# Patient Record
Sex: FEMALE
[De-identification: names, ages, dates, MRNs, and addresses within clinical notes are randomized per-mention and may not be internally consistent; named-entity substitution may affect disease eponyms.]

---

## 2020-09-23 ENCOUNTER — RECORD ABSTRACTING (OUTPATIENT)
Age: 45
End: 2020-09-23

## 2020-09-23 DIAGNOSIS — R22.1 LOCALIZED SWELLING, MASS AND LUMP, NECK: ICD-10-CM

## 2020-09-23 DIAGNOSIS — Z87.09 PERSONAL HISTORY OF OTHER DISEASES OF THE RESPIRATORY SYSTEM: ICD-10-CM

## 2020-09-23 PROBLEM — Z00.00 ENCOUNTER FOR PREVENTIVE HEALTH EXAMINATION: Status: ACTIVE | Noted: 2020-09-23

## 2020-09-29 ENCOUNTER — APPOINTMENT (OUTPATIENT)
Dept: OTOLARYNGOLOGY | Facility: CLINIC | Age: 45
End: 2020-09-29
Payer: COMMERCIAL

## 2020-09-29 VITALS
HEART RATE: 84 BPM | TEMPERATURE: 97.2 F | SYSTOLIC BLOOD PRESSURE: 132 MMHG | DIASTOLIC BLOOD PRESSURE: 91 MMHG | HEIGHT: 70 IN | BODY MASS INDEX: 24.34 KG/M2 | WEIGHT: 170 LBS

## 2020-09-29 DIAGNOSIS — F17.200 NICOTINE DEPENDENCE, UNSPECIFIED, UNCOMPLICATED: ICD-10-CM

## 2020-09-29 DIAGNOSIS — Z87.09 PERSONAL HISTORY OF OTHER DISEASES OF THE RESPIRATORY SYSTEM: ICD-10-CM

## 2020-09-29 DIAGNOSIS — J39.2 OTHER DISEASES OF PHARYNX: ICD-10-CM

## 2020-09-29 DIAGNOSIS — J38.4 EDEMA OF LARYNX: ICD-10-CM

## 2020-09-29 DIAGNOSIS — K21.9 GASTRO-ESOPHAGEAL REFLUX DISEASE W/OUT ESOPHAGITIS: ICD-10-CM

## 2020-09-29 DIAGNOSIS — R07.0 PAIN IN THROAT: ICD-10-CM

## 2020-09-29 PROCEDURE — 31575 DIAGNOSTIC LARYNGOSCOPY: CPT

## 2020-09-29 PROCEDURE — 99243 OFF/OP CNSLTJ NEW/EST LOW 30: CPT | Mod: 25

## 2020-09-29 RX ORDER — FLUTICASONE PROPIONATE 50 MCG
50 SPRAY, SUSPENSION NASAL
Refills: 0 | Status: ACTIVE | COMMUNITY

## 2020-09-29 RX ORDER — RANITIDINE 300 MG/1
300 TABLET ORAL
Refills: 0 | Status: DISCONTINUED | COMMUNITY
End: 2020-09-29

## 2020-09-29 RX ORDER — LEVOTHYROXINE SODIUM 0.03 MG/1
25 TABLET ORAL
Refills: 0 | Status: DISCONTINUED | COMMUNITY
End: 2020-09-29

## 2020-09-29 RX ORDER — LEVOTHYROXINE SODIUM 0.05 MG/1
50 TABLET ORAL
Refills: 0 | Status: DISCONTINUED | COMMUNITY
End: 2020-09-29

## 2020-09-29 RX ORDER — LEVOTHYROXINE SODIUM 175 UG/1
175 TABLET ORAL
Refills: 0 | Status: ACTIVE | COMMUNITY

## 2020-09-29 NOTE — PHYSICAL EXAM
[Hearing Christie Test (Tuning Fork On Forehead)] : no lateralization of tone [Midline] : trachea located in midline position [Laryngoscopy Performed] : laryngoscopy was performed, see procedure section for findings [Normal] : no rashes [Hearing Loss Right Only] : normal [Hearing Loss Left Only] : normal [FreeTextEntry1] : slight rightward deflected septum, mildly inflamed turbinates [de-identified] : No significant tonsil tissue, inferior pole left tonsil slight swelling compared to right [FreeTextEntry2] : sinuses nontender to percussion [de-identified] : sensations intact

## 2020-09-29 NOTE — REVIEW OF SYSTEMS
[Sneezing] : sneezing [Seasonal Allergies] : seasonal allergies [Post Nasal Drip] : post nasal drip [Ear Pain] : ear pain [Ear Noises] : ear noises [Nasal Congestion] : nasal congestion [Sinus Pain] : sinus pain [Sinus Pressure] : sinus pressure [Hoarseness] : hoarseness [Throat Clearing] : throat clearing [Swelling Neck] : swelling neck [Eyes Itch] : itching of the eyes [Palpitations] : palpitations [Heartburn] : heartburn [Swollen Glands] : swollen glands [Negative] : Endocrine [de-identified] : hives

## 2020-09-29 NOTE — ADDENDUM
[FreeTextEntry1] : Documented by Sadie Brandt acting as scribe for Dr. Landin on 09/29/2020.\par \par All Medical record entries made by the Scribe were at my, Dr. Landin, direction and personally dictated by me on 09/29/2020 . I have reviewed the chart and agree that the record accurately reflects my personal performance of the history, physical exam, assessment and plan. I have also personally directed, reviewed, and agreed with the discharge instructions.

## 2020-09-29 NOTE — CONSULT LETTER
[Dear  ___] : Dear  [unfilled], [Courtesy Letter:] : I had the pleasure of seeing your patient, [unfilled], in my office today. [Please see my note below.] : Please see my note below. [Consult Closing:] : Thank you very much for allowing me to participate in the care of this patient.  If you have any questions, please do not hesitate to contact me. [Sincerely,] : Sincerely, [FreeTextEntry3] : Mina Landin MD FACS

## 2020-09-29 NOTE — PROCEDURE
[de-identified] : Procedure:  Flexible Fiberoptic Laryngoscopy: Risks, benefits, and alternatives of flexible endoscopy were explained to the patient.  Specific mention was made of the risk of throat numbness.  The patient gave oral consent to proceed.  The nasal cavities were decongested and anesthetized with a combination of oxymetazoline and 4% lidocaine solution.  The flexible scope was inserted into the left nasal cavity and advanced towards the nasopharynx.  Visualized mucosa over the turbinates and septum were as described above.  The nasopharynx has a cyst. Mucus going through the posterior choanae. Oropharyngeal walls were symmetric and mobile without lesion, mass, or edema.  Hypopharynx was also without  lesion or edema.  Larynx was mobile without lesions. Postcricoid edema and erythema, left slightly worse than right. No pooling postcricoid or pyriform.  True vocal folds were white without mass or lesion.  Base of tongue had lymphoid hyperplasia.

## 2020-09-29 NOTE — HISTORY OF PRESENT ILLNESS
[de-identified] : The patient presents with h/o total thyroidectomy from 2010. Pt states that her PCP has been following her thyroid. She presents today with a pain in the left side of the throat. She sometimes feels like things gets stuck there. Pt was taking Zantac for her reflux but d/c it and hasn't replaced it with anything. She had an upper GI endoscopy recently that showed erosive gastritis. She sleeps on her back.

## 2020-09-29 NOTE — ASSESSMENT
[FreeTextEntry1] : left throat pain\par postcricoid edema and erythema, left slightly worse than right\par \par Plan:\par Flexible laryngoscopy. Reflux diet- no eating 2 hours before bed. Omeprazole QAM 30 minutes after morning medications and 30 minutes before morning meal. Pepcid QHS.

## 2020-10-01 ENCOUNTER — RX RENEWAL (OUTPATIENT)
Age: 45
End: 2020-10-01

## 2020-10-01 RX ORDER — FAMOTIDINE 20 MG/1
20 TABLET, FILM COATED ORAL
Qty: 90 | Refills: 3 | Status: ACTIVE | COMMUNITY
Start: 2020-09-29 | End: 1900-01-01

## 2020-10-01 RX ORDER — OMEPRAZOLE 40 MG/1
40 CAPSULE, DELAYED RELEASE ORAL
Qty: 90 | Refills: 3 | Status: ACTIVE | COMMUNITY
Start: 2020-09-29 | End: 1900-01-01

## 2023-07-21 ENCOUNTER — APPOINTMENT (OUTPATIENT)
Dept: OTOLARYNGOLOGY | Facility: CLINIC | Age: 48
End: 2023-07-21
Payer: COMMERCIAL

## 2023-07-21 VITALS
DIASTOLIC BLOOD PRESSURE: 99 MMHG | HEIGHT: 69 IN | HEART RATE: 90 BPM | BODY MASS INDEX: 26.66 KG/M2 | SYSTOLIC BLOOD PRESSURE: 166 MMHG | WEIGHT: 180 LBS

## 2023-07-21 DIAGNOSIS — J31.0 CHRONIC RHINITIS: ICD-10-CM

## 2023-07-21 DIAGNOSIS — H69.80 OTHER SPECIFIED DISORDERS OF EUSTACHIAN TUBE, UNSPECIFIED EAR: ICD-10-CM

## 2023-07-21 DIAGNOSIS — K21.9 GASTRO-ESOPHAGEAL REFLUX DISEASE W/OUT ESOPHAGITIS: ICD-10-CM

## 2023-07-21 DIAGNOSIS — J39.2 OTHER DISEASES OF PHARYNX: ICD-10-CM

## 2023-07-21 PROCEDURE — 99213 OFFICE O/P EST LOW 20 MIN: CPT | Mod: 25

## 2023-07-21 PROCEDURE — 92567 TYMPANOMETRY: CPT

## 2023-07-21 PROCEDURE — 92557 COMPREHENSIVE HEARING TEST: CPT

## 2023-07-21 PROCEDURE — 31231 NASAL ENDOSCOPY DX: CPT

## 2023-07-21 RX ORDER — AZELASTINE HYDROCHLORIDE 137 UG/1
137 SPRAY, METERED NASAL TWICE DAILY
Qty: 3 | Refills: 3 | Status: ACTIVE | COMMUNITY
Start: 2023-07-21 | End: 1900-01-01

## 2023-07-21 RX ORDER — FLUTICASONE PROPIONATE 50 UG/1
50 SPRAY, METERED NASAL
Qty: 3 | Refills: 3 | Status: ACTIVE | COMMUNITY
Start: 2023-07-21 | End: 1900-01-01

## 2023-07-21 NOTE — REASON FOR VISIT
[Subsequent Evaluation] : a subsequent evaluation for [FreeTextEntry2] : Left side head pain, ringing in the ear

## 2023-07-21 NOTE — REVIEW OF SYSTEMS
[Seasonal Allergies] : seasonal allergies [Post Nasal Drip] : post nasal drip [Ear Noises] : ear noises [Nasal Congestion] : nasal congestion [Negative] : Heme/Lymph

## 2023-07-21 NOTE — HISTORY OF PRESENT ILLNESS
[de-identified] : Teresa Sarkar is a 48 yo female who presents for evaluation of left tinnitus. She notes chronic intermittent nonpulsatile left tinnitus. She notes intermittent left otalgia as well. She denies otorrhea or recent vertigo. She feels that her hearing is generally normal. She notes some left sided cheek sinus pressure that is also intermittent. She denies nasal congestion or discolored rhinorrhea. She denies postnasal drainage. She denies history of recurrent sinusitis. She notes previous positive allergy testing. She denies recurrent ear infections. She denies recent fevers or chills. She uses saline and flonase daily. She denies dysphagia, odynophagia. She denies heartburn. She uses antireflux medication intermittently. She notes frequent jaw clenching.

## 2023-07-21 NOTE — ASSESSMENT
[FreeTextEntry1] : Teresa Sarkar presents for evaluation of left tinnitus and left otalgia. She has history of chronic rhinitis. Sinonasal endoscopy was performed showing septal deviation to the left, thin drainage bilaterally. She has known history of laryngopharyngeal reflux. Flexible laryngoscopy showed moderate postcricoid inflammation. 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 Audiogram was performed nad reviewed showing type A tymps AU and normal hearing AU. Discussed that her symptoms may be due to eustachian tube dysfunction. May also have TMJ dysfunction given her frequent jaw clenching.\par \par - For TMJ dysfunction, start a soft diet, use NSAIDs as needed, warm compresses, possible tooth guard and see oral surgery if not improved.\par - Continue flonase 2 sprays to each nostril BID.\par - Start daily oral antihistamine.\par - Start azelastine 2 sprays to each nostril BID x 1 month.\par - Antireflux lifestyle and dietary modifications.\par - Follow up in 1 month.\par

## 2023-08-18 ENCOUNTER — APPOINTMENT (OUTPATIENT)
Dept: OTOLARYNGOLOGY | Facility: CLINIC | Age: 48
End: 2023-08-18